# Patient Record
Sex: FEMALE | Race: OTHER | HISPANIC OR LATINO | URBAN - METROPOLITAN AREA
[De-identification: names, ages, dates, MRNs, and addresses within clinical notes are randomized per-mention and may not be internally consistent; named-entity substitution may affect disease eponyms.]

---

## 2021-01-05 ENCOUNTER — CLAIMS CREATED FROM THE CLAIM WINDOW (OUTPATIENT)
Dept: URBAN - METROPOLITAN AREA CLINIC 98 | Facility: CLINIC | Age: 46
End: 2021-01-05
Payer: SELF-PAY

## 2021-01-05 DIAGNOSIS — E66.9 ADULT-ONSET OBESITY: ICD-10-CM

## 2021-01-05 DIAGNOSIS — R10.13 EPIGASTRIC PAIN: ICD-10-CM

## 2021-01-05 PROBLEM — 235595009 GASTROESOPHAGEAL REFLUX DISEASE: Status: ACTIVE | Noted: 2021-01-05

## 2021-01-05 PROCEDURE — 99243 OFF/OP CNSLTJ NEW/EST LOW 30: CPT | Performed by: INTERNAL MEDICINE

## 2021-01-05 PROCEDURE — G8484 FLU IMMUNIZE NO ADMIN: HCPCS | Performed by: INTERNAL MEDICINE

## 2021-01-05 PROCEDURE — 74177 CT ABD & PELVIS W/CONTRAST: CPT | Performed by: INTERNAL MEDICINE

## 2021-01-05 PROCEDURE — 99203 OFFICE O/P NEW LOW 30 MIN: CPT | Performed by: INTERNAL MEDICINE

## 2021-01-05 RX ORDER — HYDROCODONE BITARTRATE AND ACETAMINOPHEN 5; 325 MG/1; MG/1
TABLET ORAL
Qty: 0 | Refills: 0 | Status: ACTIVE | COMMUNITY
Start: 1900-01-01

## 2021-01-05 RX ORDER — METFORMIN HYDROCHLORIDE 500 MG/1
TABLET, COATED ORAL
Qty: 0 | Refills: 0 | Status: ACTIVE | COMMUNITY
Start: 1900-01-01

## 2021-01-05 RX ORDER — LISINOPRIL 5 MG/1
TABLET ORAL
Qty: 0 | Refills: 0 | Status: ACTIVE | COMMUNITY
Start: 1900-01-01

## 2021-01-05 RX ORDER — PANTOPRAZOLE SODIUM 40 MG/1
TAKE 1 TABLET (40 MG) BY ORAL ROUTE ONCE DAILY FOR 90 DAYS TABLET, DELAYED RELEASE ORAL 1
Qty: 90 | Refills: 3 | Status: ACTIVE | COMMUNITY
Start: 2020-04-07 | End: 2021-04-02

## 2021-01-05 RX ORDER — SIMVASTATIN 10 MG/1
TABLET, FILM COATED ORAL
Qty: 0 | Refills: 0 | Status: ACTIVE | COMMUNITY
Start: 1900-01-01

## 2021-01-05 RX ORDER — AMITRIPTYLINE HYDROCHLORIDE 10 MG/1
TABLET, FILM COATED ORAL
Qty: 0 | Refills: 0 | Status: ACTIVE | COMMUNITY
Start: 1900-01-01

## 2021-01-05 RX ORDER — NAPROXEN 500 MG/1
TABLET ORAL
Qty: 0 | Refills: 0 | Status: ACTIVE | COMMUNITY
Start: 1900-01-01

## 2021-01-05 RX ORDER — OMEPRAZOLE 20 MG/1
CAPSULE, DELAYED RELEASE ORAL
Qty: 0 | Refills: 0 | Status: ACTIVE | COMMUNITY
Start: 1900-01-01

## 2021-01-05 NOTE — PHYSICAL EXAM NECK/THYROID:
normal appearance , without tenderness upon palpation , no deformities , trachea midline , Thyroid normal size , no thyroid nodules , no masses , no JVD , thyroid nontender 
7.2 K level
K+ 6.3
k+ 6.6

## 2021-01-05 NOTE — HPI-TODAY'S VISIT:
46 yo pt referred by [facility] ( pt cannot remember name of her MD ), for evaluation of abdominal pain. Copy of this consult OV sent to the [facility]. Has several mos hx of p-prandial upper abdominal pain, distention, bloating, gas and fullness, after ingestion of all meals. No N/V. Occasional regurgitation. NO dysphagia. Mild constipation w/ use of prn laxatives w/o bleeding or melenic stools. NO cardiorespiratory sxs. No fever, chills anorexia or weight loss. Has gained ~ 10 lbs / 1 mo. Recent labs: normal CBC, TSH, CMP, HbA1c, HDL 44, , LDL 99. EGD 2015: NERD, lg HH and Hp-negative gastritis, duodenal nodule. EUS: benign nodule.

## 2021-01-06 LAB
BASO (ABSOLUTE): 0.1
BASOS: 1
BUN/CREATININE RATIO: 21
BUN: 10
CARBON DIOXIDE, TOTAL: 22
CHLORIDE: 103
CREATININE: 0.48
EGFR IF AFRICN AM: 138
EGFR IF NONAFRICN AM: 120
EOS (ABSOLUTE): 0.2
EOS: 2
GLUCOSE: 94
HEMATOCRIT: 40.8
HEMATOLOGY COMMENTS:: (no result)
HEMOGLOBIN: 13.1
IMMATURE CELLS: (no result)
IMMATURE GRANS (ABS): 0
IMMATURE GRANULOCYTES: 0
LIPASE: 33
LYMPHS (ABSOLUTE): 3.4
LYMPHS: 39
MCH: 27.5
MCHC: 32.1
MCV: 86
MONOCYTES(ABSOLUTE): 0.5
MONOCYTES: 6
NEUTROPHILS (ABSOLUTE): 4.5
NEUTROPHILS: 52
NRBC: (no result)
PLATELETS: 344
POTASSIUM: 4.7
RBC: 4.77
RDW: 13.7
SODIUM: 139
WBC: 8.6

## 2021-01-09 PROBLEM — 296526005 ADULT-ONSET OBESITY: Status: ACTIVE | Noted: 2021-01-09

## 2021-01-19 ENCOUNTER — TELEPHONE ENCOUNTER (OUTPATIENT)
Dept: URBAN - METROPOLITAN AREA CLINIC 98 | Facility: CLINIC | Age: 46
End: 2021-01-19

## 2021-02-16 ENCOUNTER — TELEPHONE ENCOUNTER (OUTPATIENT)
Dept: URBAN - METROPOLITAN AREA CLINIC 92 | Facility: CLINIC | Age: 46
End: 2021-02-16

## 2023-09-09 ENCOUNTER — OFFICE VISIT (OUTPATIENT)
Dept: URBAN - METROPOLITAN AREA TELEHEALTH 2 | Facility: TELEHEALTH | Age: 48
End: 2023-09-09
Payer: SELF-PAY

## 2023-09-09 DIAGNOSIS — K21.9 GERD WITHOUT ESOPHAGITIS: ICD-10-CM

## 2023-09-09 DIAGNOSIS — Z12.11 COLON CANCER SCREENING: ICD-10-CM

## 2023-09-09 PROCEDURE — 99214 OFFICE O/P EST MOD 30 MIN: CPT | Performed by: INTERNAL MEDICINE

## 2023-09-09 PROCEDURE — 99204 OFFICE O/P NEW MOD 45 MIN: CPT | Performed by: INTERNAL MEDICINE

## 2023-09-09 RX ORDER — OMEPRAZOLE 20 MG/1
CAPSULE, DELAYED RELEASE ORAL
Qty: 0 | Refills: 0 | Status: ACTIVE | COMMUNITY
Start: 1900-01-01

## 2023-09-09 RX ORDER — FERROUS SULFATE 325(65) MG
1 TABLET TABLET ORAL
Status: ACTIVE | COMMUNITY
Start: 2023-09-11

## 2023-09-09 RX ORDER — PANTOPRAZOLE SODIUM 40 MG/1
1 TABLET TABLET, DELAYED RELEASE ORAL ONCE A DAY
Qty: 30 | Refills: 3 | OUTPATIENT
Start: 2023-09-11

## 2023-09-09 RX ORDER — LISINOPRIL 5 MG/1
TABLET ORAL
Qty: 0 | Refills: 0 | Status: ACTIVE | COMMUNITY
Start: 1900-01-01

## 2023-09-09 RX ORDER — SODIUM, POTASSIUM,MAG SULFATES 17.5-3.13G
354ML SOLUTION, RECONSTITUTED, ORAL ORAL
Qty: 345 MILLILITER | Refills: 0 | OUTPATIENT
Start: 2023-09-11 | End: 2023-09-12

## 2023-09-09 RX ORDER — NAPROXEN 500 MG/1
TABLET ORAL
Qty: 0 | Refills: 0 | Status: ACTIVE | COMMUNITY
Start: 1900-01-01

## 2023-09-09 RX ORDER — PHENTERMINE HYDROCHLORIDE 15 MG/1
1 CAPSULE CAPSULE ORAL ONCE A DAY
Status: ACTIVE | COMMUNITY
Start: 2023-09-11

## 2023-09-09 RX ORDER — SIMVASTATIN 10 MG/1
TABLET, FILM COATED ORAL
Qty: 0 | Refills: 0 | Status: ACTIVE | COMMUNITY
Start: 1900-01-01

## 2023-09-09 NOTE — HPI-TODAY'S VISIT:
This is a Telehealth OV to which patient has agreed to. Limitations of TH discussed; patient understands and agrees to proceed. Pt's at home during this virtual OV. Patient referred by Jalen Linares MD for evaluation of chronic GERD. Copy of this consult OV sent to Dr Linares 47 yo pt who has been c/o of p-prandial heartburn, dyspepsia, abdominal distention, regurgitation after ingestion of all type of foods and meals. Sxs are exacerbated by acidic and fatty meals. Occasional nocturnal TIM sxs. Denies dysphagia / odynophagia, nausea, emesis / anorexia or  weight loss.  She reports mild constipation, on prn MOM. No melenic stools and no hematochezia. Ineffective Omeprazole. NO cardiorespiratory sxs. No fever, chills anorexia or weight loss. Current weight 162 lbs. Normal labs 3 mos ago. EGD 2015: NERD, lg HH and Hp-negative gastritis, duodenal nodule. EUS: benign nodule. No other complaints.

## 2023-09-11 ENCOUNTER — LAB OUTSIDE AN ENCOUNTER (OUTPATIENT)
Dept: URBAN - METROPOLITAN AREA TELEHEALTH 2 | Facility: TELEHEALTH | Age: 48
End: 2023-09-11

## 2023-09-11 PROBLEM — 266435005: Status: ACTIVE | Noted: 2023-09-11

## 2023-09-11 PROBLEM — 237602007: Status: ACTIVE | Noted: 2023-09-11

## 2023-09-21 ENCOUNTER — TELEPHONE ENCOUNTER (OUTPATIENT)
Dept: URBAN - METROPOLITAN AREA CLINIC 98 | Facility: CLINIC | Age: 48
End: 2023-09-21

## 2023-10-12 ENCOUNTER — LAB OUTSIDE AN ENCOUNTER (OUTPATIENT)
Dept: URBAN - METROPOLITAN AREA CLINIC 98 | Facility: CLINIC | Age: 48
End: 2023-10-12

## 2023-11-09 ENCOUNTER — CLAIMS CREATED FROM THE CLAIM WINDOW (OUTPATIENT)
Dept: URBAN - METROPOLITAN AREA CLINIC 4 | Facility: CLINIC | Age: 48
End: 2023-11-09
Payer: SELF-PAY

## 2023-11-09 ENCOUNTER — OUT OF OFFICE VISIT (OUTPATIENT)
Dept: URBAN - METROPOLITAN AREA SURGERY CENTER 18 | Facility: SURGERY CENTER | Age: 48
End: 2023-11-09
Payer: SELF-PAY

## 2023-11-09 DIAGNOSIS — K25.9 GASTRIC EROSION, UNSPECIFIED CHRONICITY: ICD-10-CM

## 2023-11-09 DIAGNOSIS — D12.2 ADENOMATOUS POLYP OF ASCENDING COLON: ICD-10-CM

## 2023-11-09 DIAGNOSIS — K31.9 ERYTHEMA OF GASTRIC ANTRUM: ICD-10-CM

## 2023-11-09 DIAGNOSIS — K29.70 GASTRITIS, UNSPECIFIED, WITHOUT BLEEDING: ICD-10-CM

## 2023-11-09 DIAGNOSIS — K31.89 OTHER DISEASES OF STOMACH AND DUODENUM: ICD-10-CM

## 2023-11-09 DIAGNOSIS — Z12.11 ENCOUNTER FOR SCREENING FOR MALIGNANT NEOPLASM OF COLON: ICD-10-CM

## 2023-11-09 DIAGNOSIS — K21.9 GASTRO-ESOPHAGEAL REFLUX DISEASE WITHOUT ESOPHAGITIS: ICD-10-CM

## 2023-11-09 DIAGNOSIS — K44.9 HIATAL HERNIA: ICD-10-CM

## 2023-11-09 DIAGNOSIS — K63.89 OTHER SPECIFIED DISEASES OF INTESTINE: ICD-10-CM

## 2023-11-09 DIAGNOSIS — K29.50 CHRONIC GASTRITIS WITHOUT BLEEDING, UNSPECIFIED GASTRITIS TYPE: ICD-10-CM

## 2023-11-09 DIAGNOSIS — Z12.11 COLON CANCER SCREENING (HIGH RISK): ICD-10-CM

## 2023-11-09 PROCEDURE — 88342 IMHCHEM/IMCYTCHM 1ST ANTB: CPT | Performed by: PATHOLOGY

## 2023-11-09 PROCEDURE — 00813 ANES UPR LWR GI NDSC PX: CPT | Performed by: ANESTHESIOLOGY

## 2023-11-09 PROCEDURE — 88305 TISSUE EXAM BY PATHOLOGIST: CPT | Performed by: PATHOLOGY

## 2023-11-09 PROCEDURE — 45380 COLONOSCOPY AND BIOPSY: CPT | Performed by: INTERNAL MEDICINE

## 2023-11-09 PROCEDURE — 43239 EGD BIOPSY SINGLE/MULTIPLE: CPT | Performed by: INTERNAL MEDICINE

## 2023-11-09 PROCEDURE — G8907 PT DOC NO EVENTS ON DISCHARG: HCPCS | Performed by: INTERNAL MEDICINE

## 2023-11-10 ENCOUNTER — TELEPHONE ENCOUNTER (OUTPATIENT)
Dept: URBAN - METROPOLITAN AREA CLINIC 98 | Facility: CLINIC | Age: 48
End: 2023-11-10

## 2023-11-10 RX ORDER — PANTOPRAZOLE SODIUM 40 MG/1
1 TABLET TABLET, DELAYED RELEASE ORAL ONCE A DAY
Qty: 30 TABLET | Refills: 3 | OUTPATIENT
Start: 2023-11-10

## 2023-12-04 ENCOUNTER — OFFICE VISIT (OUTPATIENT)
Dept: URBAN - METROPOLITAN AREA TELEHEALTH 2 | Facility: TELEHEALTH | Age: 48
End: 2023-12-04
Payer: SELF-PAY

## 2023-12-04 DIAGNOSIS — K57.30 ACQUIRED DIVERTICULOSIS OF COLON: ICD-10-CM

## 2023-12-04 DIAGNOSIS — E88.81 METABOLIC SYNDROME: ICD-10-CM

## 2023-12-04 DIAGNOSIS — K21.9 GERD WITHOUT ESOPHAGITIS: ICD-10-CM

## 2023-12-04 DIAGNOSIS — Z86.010 PERSONAL HISTORY OF COLONIC POLYPS: ICD-10-CM

## 2023-12-04 PROCEDURE — 99214 OFFICE O/P EST MOD 30 MIN: CPT | Performed by: INTERNAL MEDICINE

## 2023-12-04 RX ORDER — PANTOPRAZOLE SODIUM 40 MG/1
1 TABLET TABLET, DELAYED RELEASE ORAL ONCE A DAY
Qty: 30 | Refills: 3 | Status: ACTIVE | COMMUNITY
Start: 2023-09-11

## 2023-12-04 RX ORDER — SIMVASTATIN 10 MG/1
TABLET, FILM COATED ORAL
Qty: 0 | Refills: 0 | Status: ACTIVE | COMMUNITY
Start: 1900-01-01

## 2023-12-04 RX ORDER — LISINOPRIL 5 MG/1
TABLET ORAL
Qty: 0 | Refills: 0 | Status: ACTIVE | COMMUNITY
Start: 1900-01-01

## 2023-12-04 RX ORDER — PANTOPRAZOLE SODIUM 40 MG/1
1 TABLET TABLET, DELAYED RELEASE ORAL ONCE A DAY
Qty: 30 TABLET | Refills: 3 | Status: ACTIVE | COMMUNITY
Start: 2023-11-10

## 2023-12-04 RX ORDER — PHENTERMINE HYDROCHLORIDE 15 MG/1
1 CAPSULE CAPSULE ORAL ONCE A DAY
Status: ACTIVE | COMMUNITY
Start: 2023-09-11

## 2023-12-04 RX ORDER — FERROUS SULFATE 325(65) MG
1 TABLET TABLET ORAL
Status: ACTIVE | COMMUNITY
Start: 2023-09-11

## 2023-12-04 RX ORDER — PANTOPRAZOLE SODIUM 40 MG/1
1 TABLET TABLET, DELAYED RELEASE ORAL ONCE A DAY
Qty: 30 | Refills: 3 | OUTPATIENT

## 2023-12-04 RX ORDER — NAPROXEN 500 MG/1
TABLET ORAL
Qty: 0 | Refills: 0 | Status: ACTIVE | COMMUNITY
Start: 1900-01-01

## 2023-12-04 RX ORDER — OMEPRAZOLE 20 MG/1
CAPSULE, DELAYED RELEASE ORAL
Qty: 0 | Refills: 0 | Status: ACTIVE | COMMUNITY
Start: 1900-01-01

## 2023-12-04 NOTE — HPI-TODAY'S VISIT:
This is a Telehealth OV to which patient has agreed to. Limitations of TH discussed; patient understands and agrees to proceed. Pt's at home during this virtual OV. 49 yo pt, here for follow after EGD / colonoscopy.  She has  been c/o less dyspepsia, abdominal distention and regurgitation. Overall, feeling well wo TIM sxs nor abdominal discomfort since she has been on a healthy diet pantoprazole 40 mg po qd. EGD 11/23: NERD, sm HH, chronic Hp-negative gastritis and normal duodenal bx's. Colonoscopy 11/23: AC pp, L-diverticulosis and E + I Hrrds. She reports mild constipation, on prn MOM. No melenic stools and no hematochezia. NO cardiorespiratory sxs. No fever, chills anorexia or weight loss. Current weight 162 lbs. Normal labs few mos ago. No other complaints.

## 2023-12-06 ENCOUNTER — DASHBOARD ENCOUNTERS (OUTPATIENT)
Age: 48
End: 2023-12-06

## 2023-12-06 PROBLEM — 428283002: Status: ACTIVE | Noted: 2023-12-06

## 2023-12-06 PROBLEM — 397881000: Status: ACTIVE | Noted: 2023-12-06

## 2024-01-11 ENCOUNTER — OFFICE VISIT (OUTPATIENT)
Dept: URBAN - METROPOLITAN AREA CLINIC 98 | Facility: CLINIC | Age: 49
End: 2024-01-11

## 2024-09-06 ENCOUNTER — LAB OUTSIDE AN ENCOUNTER (OUTPATIENT)
Dept: URBAN - METROPOLITAN AREA CLINIC 98 | Facility: CLINIC | Age: 49
End: 2024-09-06

## 2024-09-06 ENCOUNTER — OFFICE VISIT (OUTPATIENT)
Dept: URBAN - METROPOLITAN AREA CLINIC 98 | Facility: CLINIC | Age: 49
End: 2024-09-06
Payer: SELF-PAY

## 2024-09-06 VITALS
HEIGHT: 60 IN | SYSTOLIC BLOOD PRESSURE: 146 MMHG | HEART RATE: 70 BPM | WEIGHT: 169.4 LBS | DIASTOLIC BLOOD PRESSURE: 93 MMHG | BODY MASS INDEX: 33.26 KG/M2 | TEMPERATURE: 93.7 F

## 2024-09-06 DIAGNOSIS — K57.30 ACQUIRED DIVERTICULOSIS OF COLON: ICD-10-CM

## 2024-09-06 DIAGNOSIS — R11.2 NAUSEA AND VOMITING, UNSPECIFIED VOMITING TYPE: ICD-10-CM

## 2024-09-06 DIAGNOSIS — K21.9 GERD WITHOUT ESOPHAGITIS: ICD-10-CM

## 2024-09-06 DIAGNOSIS — Z86.010 PERSONAL HISTORY OF COLONIC POLYPS: ICD-10-CM

## 2024-09-06 PROCEDURE — 99214 OFFICE O/P EST MOD 30 MIN: CPT | Performed by: INTERNAL MEDICINE

## 2024-09-06 RX ORDER — PANTOPRAZOLE SODIUM 40 MG/1
1 TABLET TABLET, DELAYED RELEASE ORAL TWICE A DAY
Qty: 60 TABLET | Refills: 1 | OUTPATIENT
Start: 2024-09-06

## 2024-09-06 RX ORDER — PANTOPRAZOLE SODIUM 40 MG/1
1 TABLET TABLET, DELAYED RELEASE ORAL ONCE A DAY
Qty: 30 | Refills: 3 | Status: ACTIVE | COMMUNITY

## 2024-09-06 RX ORDER — LISINOPRIL 5 MG/1
TABLET ORAL
Qty: 0 | Refills: 0 | Status: ACTIVE | COMMUNITY
Start: 1900-01-01

## 2024-09-06 RX ORDER — SUCRALFATE 1 G/1
1 TABLET ON AN EMPTY STOMACH TABLET ORAL TWICE A DAY
Qty: 60 | Refills: 1 | OUTPATIENT
Start: 2024-09-06 | End: 2024-11-04

## 2024-09-06 RX ORDER — PHENTERMINE HYDROCHLORIDE 15 MG/1
1 CAPSULE CAPSULE ORAL ONCE A DAY
Status: ACTIVE | COMMUNITY
Start: 2023-09-11

## 2024-09-06 RX ORDER — SIMVASTATIN 10 MG/1
TABLET, FILM COATED ORAL
Qty: 0 | Refills: 0 | Status: ACTIVE | COMMUNITY
Start: 1900-01-01

## 2024-09-06 RX ORDER — OMEPRAZOLE 20 MG/1
CAPSULE, DELAYED RELEASE ORAL
Qty: 0 | Refills: 0 | Status: ACTIVE | COMMUNITY
Start: 1900-01-01

## 2024-09-06 RX ORDER — NAPROXEN 500 MG/1
TABLET ORAL
Qty: 0 | Refills: 0 | Status: ACTIVE | COMMUNITY
Start: 1900-01-01

## 2024-09-06 RX ORDER — FERROUS SULFATE 325(65) MG
1 TABLET TABLET ORAL
Status: ACTIVE | COMMUNITY
Start: 2023-09-11

## 2024-09-06 RX ORDER — PANTOPRAZOLE SODIUM 40 MG/1
1 TABLET TABLET, DELAYED RELEASE ORAL ONCE A DAY
Qty: 30 TABLET | Refills: 3 | Status: ACTIVE | COMMUNITY
Start: 2023-11-10

## 2024-09-06 NOTE — HPI-TODAY'S VISIT:
12/4/23- Dr. Veloz This is a Telehealth OV to which patient has agreed to. Limitations of TH discussed; patient understands and agrees to proceed. Pt's at home during this virtual OV. 49 yo pt, here for follow after EGD / colonoscopy.  She has  been c/o less dyspepsia, abdominal distention and regurgitation. Overall, feeling well wo TIM sxs nor abdominal discomfort since she has been on a healthy diet pantoprazole 40 mg po qd. EGD 11/23: NERD, sm HH, chronic Hp-negative gastritis and normal duodenal bx's. Colonoscopy 11/23: AC pp, L-diverticulosis and E + I Hrrds. She reports mild constipation, on prn MOM. No melenic stools and no hematochezia. NO cardiorespiratory sxs. No fever, chills anorexia or weight loss. Current weight 162 lbs. Normal labs few mos ago. No other complaints. 9/6/24- Sweetie Chávez NP - 50 yo female here with complaints of abdominal pain - No PCP -  [facility] video # [id number] - Epigastric pain - Eating makes the pain - 10/12/2023 gastric emptying study :IMPRESSION:        Normal solid gastric emptying study - Colonoscopy 11/9/2023- small internal and external hemorrhoids, moderate sigmoid spasm, diverticulosis in sigmoid and descending colon, ascending polyp. Bx. polyp no significant abnormality - EGD 11/9/2023- small hiatal hernia, inflammation in gastric body/antrum with erosions. Bx. stomach- chronic gastritis - BM formed and normal - Yesterday vomited twice

## 2024-09-09 ENCOUNTER — CLAIMS CREATED FROM THE CLAIM WINDOW (OUTPATIENT)
Dept: URBAN - METROPOLITAN AREA MEDICAL CENTER 39 | Facility: MEDICAL CENTER | Age: 49
End: 2024-09-09
Payer: SELF-PAY

## 2024-09-09 DIAGNOSIS — R11.2 NAUSEA & VOMITING: ICD-10-CM

## 2024-09-09 DIAGNOSIS — R93.3 ABN FINDINGS-GI TRACT: ICD-10-CM

## 2024-09-09 DIAGNOSIS — R10.84 ABDOMINAL PAIN, GENERALIZED: ICD-10-CM

## 2024-09-09 PROCEDURE — 99255 IP/OBS CONSLTJ NEW/EST HI 80: CPT | Performed by: INTERNAL MEDICINE

## 2024-09-10 ENCOUNTER — CLAIMS CREATED FROM THE CLAIM WINDOW (OUTPATIENT)
Dept: URBAN - METROPOLITAN AREA MEDICAL CENTER 39 | Facility: MEDICAL CENTER | Age: 49
End: 2024-09-10
Payer: SELF-PAY

## 2024-09-10 DIAGNOSIS — R93.3 ABN FINDINGS-GI TRACT: ICD-10-CM

## 2024-09-10 DIAGNOSIS — R10.84 ABDOMINAL PAIN, GENERALIZED: ICD-10-CM

## 2024-09-10 PROCEDURE — 99232 SBSQ HOSP IP/OBS MODERATE 35: CPT | Performed by: INTERNAL MEDICINE

## 2024-09-11 ENCOUNTER — CLAIMS CREATED FROM THE CLAIM WINDOW (OUTPATIENT)
Dept: URBAN - METROPOLITAN AREA MEDICAL CENTER 39 | Facility: MEDICAL CENTER | Age: 49
End: 2024-09-11
Payer: SELF-PAY

## 2024-09-11 DIAGNOSIS — R10.84 ABDOMINAL PAIN, GENERALIZED: ICD-10-CM

## 2024-09-11 DIAGNOSIS — R93.3 ABN FINDINGS-GI TRACT: ICD-10-CM

## 2024-09-11 PROCEDURE — 99232 SBSQ HOSP IP/OBS MODERATE 35: CPT | Performed by: INTERNAL MEDICINE

## 2024-09-25 ENCOUNTER — OFFICE VISIT (OUTPATIENT)
Dept: URBAN - METROPOLITAN AREA CLINIC 98 | Facility: CLINIC | Age: 49
End: 2024-09-25
Payer: SELF-PAY

## 2024-09-25 ENCOUNTER — LAB OUTSIDE AN ENCOUNTER (OUTPATIENT)
Dept: URBAN - METROPOLITAN AREA CLINIC 98 | Facility: CLINIC | Age: 49
End: 2024-09-25

## 2024-09-25 VITALS
HEIGHT: 60 IN | WEIGHT: 168.6 LBS | BODY MASS INDEX: 33.1 KG/M2 | TEMPERATURE: 97.1 F | SYSTOLIC BLOOD PRESSURE: 141 MMHG | HEART RATE: 67 BPM | DIASTOLIC BLOOD PRESSURE: 88 MMHG

## 2024-09-25 DIAGNOSIS — R93.5 ABNORMAL ABDOMINAL CT SCAN: ICD-10-CM

## 2024-09-25 DIAGNOSIS — R10.10 PAIN OF UPPER ABDOMEN: ICD-10-CM

## 2024-09-25 DIAGNOSIS — K21.9 CHRONIC GERD: ICD-10-CM

## 2024-09-25 DIAGNOSIS — E66.9 OBESITY (BMI 30.0-34.9): ICD-10-CM

## 2024-09-25 PROBLEM — 307496006: Status: ACTIVE | Noted: 2024-09-25

## 2024-09-25 PROCEDURE — 99215 OFFICE O/P EST HI 40 MIN: CPT | Performed by: INTERNAL MEDICINE

## 2024-09-25 RX ORDER — NAPROXEN 500 MG/1
TABLET ORAL
Qty: 0 | Refills: 0 | COMMUNITY
Start: 1900-01-01

## 2024-09-25 RX ORDER — FERROUS SULFATE 325(65) MG
1 TABLET TABLET ORAL
COMMUNITY
Start: 2023-09-11

## 2024-09-25 RX ORDER — OMEPRAZOLE 20 MG/1
CAPSULE, DELAYED RELEASE ORAL
Qty: 0 | Refills: 0 | COMMUNITY
Start: 1900-01-01

## 2024-09-25 RX ORDER — PANTOPRAZOLE SODIUM 40 MG/1
1 TABLET TABLET, DELAYED RELEASE ORAL TWICE A DAY
Qty: 60 TABLET | Refills: 1 | COMMUNITY
Start: 2024-09-06

## 2024-09-25 RX ORDER — LISINOPRIL 5 MG/1
TABLET ORAL
Qty: 0 | Refills: 0 | Status: ACTIVE | COMMUNITY
Start: 1900-01-01

## 2024-09-25 RX ORDER — PANTOPRAZOLE SODIUM 40 MG/1
1 TABLET TABLET, DELAYED RELEASE ORAL ONCE A DAY
Qty: 30 TABLET | Refills: 3 | Status: ACTIVE | COMMUNITY
Start: 2023-11-10

## 2024-09-25 RX ORDER — SIMVASTATIN 10 MG/1
TABLET, FILM COATED ORAL
Qty: 0 | Refills: 0 | COMMUNITY
Start: 1900-01-01

## 2024-09-25 RX ORDER — PHENTERMINE HYDROCHLORIDE 15 MG/1
1 CAPSULE CAPSULE ORAL ONCE A DAY
COMMUNITY
Start: 2023-09-11

## 2024-09-25 RX ORDER — PANTOPRAZOLE SODIUM 40 MG/1
1 TABLET TABLET, DELAYED RELEASE ORAL ONCE A DAY
Qty: 30 | Refills: 3 | COMMUNITY

## 2024-09-25 RX ORDER — SUCRALFATE 1 G/1
1 TABLET ON AN EMPTY STOMACH TABLET ORAL TWICE A DAY
Qty: 60 | Refills: 1 | COMMUNITY
Start: 2024-09-06 | End: 2024-11-04

## 2024-09-25 NOTE — HPI-TODAY'S VISIT:
50 yo pt w chronic GERD / HH, L-diverticulosis, hx L-renal artery aneurysm s/p resection and aneurysmography 2021, DM2, HTN, who was admitted 3 weeks ago to [facility] for evaluation of abdominal pain, A + P CT scan: extensive wall thickening w pericolonic fat stranding of jejunal bowel loop; CRP 19.1, repeat 21, w mild leukocytosis wo anemia, CMP, microhematuria, low Mg and low K treated w Ceftriaxone / Flagyl. Today, she comes for follow-up w c/o upper abdominal rather constant pain, distention, gas, w LUQ and L-flank pain, wo nausea, emesis, diarrhea, constipation, hematuria, fever, chills night sweats. Has been on a bland diet since D/c from the Hospital, having small, non-bloody bm's wo mucus and no melenic stools. Denies cardiorespiratory sxs. Colonoscopy 11/23: Ac - pp, TA. L-diverticulosis, I - E Hrrds. EGD 11/23: NERD, sm HH, chronic Hp-negative gastritis and normal duodenal bx's.  No other complaints.

## 2024-09-26 LAB
A/G RATIO: 1.6
ABSOLUTE BASOPHILS: 52
ABSOLUTE EOSINOPHILS: 162
ABSOLUTE LYMPHOCYTES: 3028
ABSOLUTE MONOCYTES: 429
ABSOLUTE NEUTROPHILS: 2129
ALBUMIN: 4.6
ALKALINE PHOSPHATASE: 73
ALT (SGPT): 27
AST (SGOT): 33
BASOPHILS: 0.9
BILIRUBIN, TOTAL: 0.9
BUN/CREATININE RATIO: (no result)
BUN: 9
C-REACTIVE PROTEIN, QUANT: <3
CALCIUM: 9.7
CARBON DIOXIDE, TOTAL: 25
CHLORIDE: 105
CREATININE: 0.53
EGFR: 114
EOSINOPHILS: 2.8
GLOBULIN, TOTAL: 2.9
GLUCOSE: 82
HEMATOCRIT: 39.7
HEMOGLOBIN: 12.9
LIPASE: 46
LYMPHOCYTES: 52.2
MCH: 27.4
MCHC: 32.5
MCV: 84.3
MONOCYTES: 7.4
MPV: 9.6
NEUTROPHILS: 36.7
PLATELET COUNT: 419
POTASSIUM: 3.9
PROTEIN, TOTAL: 7.5
RDW: 13.5
RED BLOOD CELL COUNT: 4.71
SODIUM: 141
WHITE BLOOD CELL COUNT: 5.8

## 2024-09-29 PROBLEM — 443371000124107: Status: ACTIVE | Noted: 2024-09-29

## 2024-09-29 PROBLEM — 235595009: Status: ACTIVE | Noted: 2024-09-29

## 2024-10-03 ENCOUNTER — CLAIMS CREATED FROM THE CLAIM WINDOW (OUTPATIENT)
Dept: URBAN - METROPOLITAN AREA MEDICAL CENTER 39 | Facility: MEDICAL CENTER | Age: 49
End: 2024-10-03
Payer: SELF-PAY

## 2024-10-03 ENCOUNTER — OFFICE VISIT (OUTPATIENT)
Dept: URBAN - METROPOLITAN AREA CLINIC 98 | Facility: CLINIC | Age: 49
End: 2024-10-03
Payer: SELF-PAY

## 2024-10-03 VITALS
BODY MASS INDEX: 32.2 KG/M2 | HEIGHT: 60 IN | DIASTOLIC BLOOD PRESSURE: 89 MMHG | SYSTOLIC BLOOD PRESSURE: 132 MMHG | TEMPERATURE: 97 F | WEIGHT: 164 LBS | HEART RATE: 63 BPM

## 2024-10-03 DIAGNOSIS — R93.3 ABN FINDINGS-GI TRACT: ICD-10-CM

## 2024-10-03 DIAGNOSIS — R10.11 ABDOMINAL BURNING SENSATION IN RIGHT UPPER QUADRANT: ICD-10-CM

## 2024-10-03 DIAGNOSIS — K21.9 CHRONIC GERD: ICD-10-CM

## 2024-10-03 DIAGNOSIS — E66.811 CLASS 1 OBESITY: ICD-10-CM

## 2024-10-03 DIAGNOSIS — R10.12 ABDOMINAL PAIN, LEFT UPPER QUADRANT: ICD-10-CM

## 2024-10-03 DIAGNOSIS — R11.2 NAUSEA AND VOMITING: ICD-10-CM

## 2024-10-03 DIAGNOSIS — R10.12 LEFT UPPER QUADRANT ABDOMINAL PAIN: ICD-10-CM

## 2024-10-03 DIAGNOSIS — R93.5 ABNORMAL ABDOMINAL CT SCAN: ICD-10-CM

## 2024-10-03 DIAGNOSIS — R10.32 ABDOMINAL CRAMPING IN LEFT LOWER QUADRANT: ICD-10-CM

## 2024-10-03 PROCEDURE — 99254 IP/OBS CNSLTJ NEW/EST MOD 60: CPT | Performed by: INTERNAL MEDICINE

## 2024-10-03 PROCEDURE — 99244 OFF/OP CNSLTJ NEW/EST MOD 40: CPT | Performed by: INTERNAL MEDICINE

## 2024-10-03 PROCEDURE — 99214 OFFICE O/P EST MOD 30 MIN: CPT | Performed by: INTERNAL MEDICINE

## 2024-10-03 RX ORDER — PANTOPRAZOLE SODIUM 40 MG/1
1 TABLET TABLET, DELAYED RELEASE ORAL TWICE A DAY
Qty: 60 TABLET | Refills: 1 | COMMUNITY
Start: 2024-09-06

## 2024-10-03 RX ORDER — PANTOPRAZOLE SODIUM 40 MG/1
1 TABLET TABLET, DELAYED RELEASE ORAL ONCE A DAY
Qty: 30 | Refills: 3 | COMMUNITY

## 2024-10-03 RX ORDER — SIMVASTATIN 10 MG/1
TABLET, FILM COATED ORAL
Qty: 0 | Refills: 0 | COMMUNITY
Start: 1900-01-01

## 2024-10-03 RX ORDER — OMEPRAZOLE 20 MG/1
CAPSULE, DELAYED RELEASE ORAL
Qty: 0 | Refills: 0 | COMMUNITY
Start: 1900-01-01

## 2024-10-03 RX ORDER — SUCRALFATE 1 G/1
1 TABLET ON AN EMPTY STOMACH TABLET ORAL TWICE A DAY
Qty: 60 | Refills: 1 | COMMUNITY
Start: 2024-09-06 | End: 2024-11-04

## 2024-10-03 RX ORDER — NAPROXEN 500 MG/1
TABLET ORAL
Qty: 0 | Refills: 0 | COMMUNITY
Start: 1900-01-01

## 2024-10-03 RX ORDER — FERROUS SULFATE 325(65) MG
1 TABLET TABLET ORAL
COMMUNITY
Start: 2023-09-11

## 2024-10-03 RX ORDER — PANTOPRAZOLE SODIUM 40 MG/1
1 TABLET TABLET, DELAYED RELEASE ORAL ONCE A DAY
Qty: 30 TABLET | Refills: 3 | Status: ACTIVE | COMMUNITY
Start: 2023-11-10

## 2024-10-03 RX ORDER — PHENTERMINE HYDROCHLORIDE 15 MG/1
1 CAPSULE CAPSULE ORAL ONCE A DAY
COMMUNITY
Start: 2023-09-11

## 2024-10-03 RX ORDER — LISINOPRIL 5 MG/1
TABLET ORAL
Qty: 0 | Refills: 0 | Status: ACTIVE | COMMUNITY
Start: 1900-01-01

## 2024-10-03 NOTE — HPI-TODAY'S VISIT:
50 yo pt w chronic GERD / HH, L-diverticulosis, hx L-renal artery aneurysm s/p resection and aneurysmography 2021, DM2, HTN, who was admitted 3 weeks ago to [facility] for evaluation of abdominal pain, A + P CT scan: extensive wall thickening w pericolonic fat stranding of jejunal bowel loop; CRP 19.1, repeat 21, w mild leukocytosis wo anemia, CMP, microhematuria, low Mg and low K treated w Ceftriaxone / Flagyl. Today, she comes for follow-up w c/o increasing upper abdominal,  rather constant pain, distention, gas, w LUQ and L-flank pain, wo nausea, emesis, diarrhea, constipation, hematuria. She had fever last night. Has been on a bland diet since D/c from the Hospital, having small, non-bloody bm's wo mucus and no melenic stools. Denies cardiorespiratory sxs. Colonoscopy 11/23: Ac - pp, TA. L-diverticulosis, I - E Hrrds. EGD 11/23: NERD, sm HH, chronic Hp-negative gastritis and normal duodenal bx's.  No other complaints.

## 2024-10-04 ENCOUNTER — CLAIMS CREATED FROM THE CLAIM WINDOW (OUTPATIENT)
Dept: URBAN - METROPOLITAN AREA MEDICAL CENTER 39 | Facility: MEDICAL CENTER | Age: 49
End: 2024-10-04
Payer: SELF-PAY

## 2024-10-04 DIAGNOSIS — R11.2 NAUSEA AND VOMITING: ICD-10-CM

## 2024-10-04 DIAGNOSIS — R10.84 ABDOMINAL PAIN, GENERALIZED: ICD-10-CM

## 2024-10-04 PROCEDURE — 99232 SBSQ HOSP IP/OBS MODERATE 35: CPT | Performed by: INTERNAL MEDICINE

## 2024-11-05 ENCOUNTER — OFFICE VISIT (OUTPATIENT)
Dept: URBAN - METROPOLITAN AREA CLINIC 98 | Facility: CLINIC | Age: 49
End: 2024-11-05

## 2024-11-05 ENCOUNTER — OFFICE VISIT (OUTPATIENT)
Dept: URBAN - METROPOLITAN AREA CLINIC 97 | Facility: CLINIC | Age: 49
End: 2024-11-05

## 2024-11-11 ENCOUNTER — TELEPHONE ENCOUNTER (OUTPATIENT)
Dept: URBAN - METROPOLITAN AREA CLINIC 5 | Facility: CLINIC | Age: 49
End: 2024-11-11

## 2024-11-20 ENCOUNTER — OFFICE VISIT (OUTPATIENT)
Dept: URBAN - METROPOLITAN AREA CLINIC 98 | Facility: CLINIC | Age: 49
End: 2024-11-20

## 2025-01-07 ENCOUNTER — OFFICE VISIT (OUTPATIENT)
Dept: URBAN - METROPOLITAN AREA CLINIC 97 | Facility: CLINIC | Age: 50
End: 2025-01-07
Payer: SELF-PAY

## 2025-01-07 DIAGNOSIS — R11.2 NAUSEA AND VOMITING, UNSPECIFIED VOMITING TYPE: ICD-10-CM

## 2025-01-07 DIAGNOSIS — R10.13 EPIGASTRIC PAIN: ICD-10-CM

## 2025-01-07 PROCEDURE — 76700 US EXAM ABDOM COMPLETE: CPT | Performed by: INTERNAL MEDICINE

## 2025-01-14 ENCOUNTER — OFFICE VISIT (OUTPATIENT)
Dept: URBAN - METROPOLITAN AREA CLINIC 98 | Facility: CLINIC | Age: 50
End: 2025-01-14
Payer: SELF-PAY

## 2025-01-14 VITALS
TEMPERATURE: 97.5 F | HEIGHT: 63 IN | HEART RATE: 71 BPM | WEIGHT: 165 LBS | SYSTOLIC BLOOD PRESSURE: 122 MMHG | DIASTOLIC BLOOD PRESSURE: 77 MMHG | BODY MASS INDEX: 29.23 KG/M2

## 2025-01-14 DIAGNOSIS — K21.9 CHRONIC GERD: ICD-10-CM

## 2025-01-14 DIAGNOSIS — R14.0 ABDOMINAL BLOATING: ICD-10-CM

## 2025-01-14 PROCEDURE — 99214 OFFICE O/P EST MOD 30 MIN: CPT | Performed by: INTERNAL MEDICINE

## 2025-01-14 RX ORDER — PANTOPRAZOLE SODIUM 40 MG/1
1 TABLET TABLET, DELAYED RELEASE ORAL ONCE A DAY
Qty: 30 | Refills: 3 | COMMUNITY

## 2025-01-14 RX ORDER — SIMVASTATIN 10 MG/1
TABLET, FILM COATED ORAL
Qty: 0 | Refills: 0 | COMMUNITY
Start: 1900-01-01

## 2025-01-14 RX ORDER — CHLORDIAZEPOXIDE HYDROCHLORIDE AND CLIDINIUM BROMIDE 5; 2.5 MG/1; MG/1
1 CAPSULE BEFORE MEALS CAPSULE ORAL TWICE A DAY
Qty: 60 | Refills: 3 | OUTPATIENT
Start: 2025-01-14 | End: 2025-05-14

## 2025-01-14 RX ORDER — OMEPRAZOLE 20 MG/1
CAPSULE, DELAYED RELEASE ORAL
Qty: 0 | Refills: 0 | COMMUNITY
Start: 1900-01-01

## 2025-01-14 RX ORDER — NAPROXEN 500 MG/1
TABLET ORAL
Qty: 0 | Refills: 0 | COMMUNITY
Start: 1900-01-01

## 2025-01-14 RX ORDER — PHENTERMINE HYDROCHLORIDE 15 MG/1
1 CAPSULE CAPSULE ORAL ONCE A DAY
COMMUNITY
Start: 2023-09-11

## 2025-01-14 RX ORDER — LISINOPRIL 5 MG/1
TABLET ORAL
Qty: 0 | Refills: 0 | Status: ACTIVE | COMMUNITY
Start: 1900-01-01

## 2025-01-14 RX ORDER — FERROUS SULFATE 325(65) MG
1 TABLET TABLET ORAL
COMMUNITY
Start: 2023-09-11

## 2025-01-14 RX ORDER — PANTOPRAZOLE SODIUM 40 MG/1
1 TABLET TABLET, DELAYED RELEASE ORAL ONCE A DAY
Qty: 30 TABLET | Refills: 3 | Status: ACTIVE | COMMUNITY
Start: 2023-11-10

## 2025-01-14 RX ORDER — PANTOPRAZOLE SODIUM 40 MG/1
1 TABLET 1/2 TO 1 HOUR BEFORE MORNING MEAL TABLET, DELAYED RELEASE ORAL ONCE A DAY
Qty: 30 | Refills: 3 | OUTPATIENT
Start: 2025-01-14

## 2025-01-14 RX ORDER — PANTOPRAZOLE SODIUM 40 MG/1
1 TABLET TABLET, DELAYED RELEASE ORAL TWICE A DAY
Qty: 60 TABLET | Refills: 1 | COMMUNITY
Start: 2024-09-06

## 2025-01-14 NOTE — HPI-TODAY'S VISIT:
49 yo pt w chronic GERD / HH, L-diverticulosis, hx L-renal artery aneurysm s/p resection and aneurysmography 2021, DM2, HTN, who was admitted last year to [facility] for evaluation of abdominal pain, A + P CT scan: extensive wall thickening w pericolonic fat stranding of jejunal bowel loop; CRP 19.1, repeat 21, w mild leukocytosis wo anemia, CMP, microhematuria, low Mg and low K treated w Ceftriaxone / Flagyl. She was seen in follow-up last mo w recurrent abdominal pain: she was readmitted to [facility]: normal labs and A + P Ct scan. Today, she comes for follow-up w c/o ip-prandial abdominal discomfort w bloating, gas and upper abdoinal distention. No N/V / disrrhea nor GIB. Mild constipation controlled w Mg supplement and papaya smoothy. Has been on an elimination, and reports feeling very anxious from unclear reasosns. Denies cardiorespiratory sxs. Colonoscopy 11/23: Ac - pp, TA. L-diverticulosis, I - E Hrrds. EGD 11/23: NERD, sm HH, chronic Hp-negative gastritis and normal duodenal bx's.  No other complaints.